# Patient Record
Sex: MALE | Race: OTHER | HISPANIC OR LATINO | URBAN - METROPOLITAN AREA
[De-identification: names, ages, dates, MRNs, and addresses within clinical notes are randomized per-mention and may not be internally consistent; named-entity substitution may affect disease eponyms.]

---

## 2019-03-06 ENCOUNTER — EMERGENCY (EMERGENCY)
Facility: HOSPITAL | Age: 34
LOS: 1 days | Discharge: HOME | End: 2019-03-06
Attending: EMERGENCY MEDICINE | Admitting: EMERGENCY MEDICINE
Payer: COMMERCIAL

## 2019-03-06 VITALS
OXYGEN SATURATION: 99 % | RESPIRATION RATE: 18 BRPM | SYSTOLIC BLOOD PRESSURE: 123 MMHG | WEIGHT: 160.06 LBS | HEART RATE: 61 BPM | TEMPERATURE: 98 F | DIASTOLIC BLOOD PRESSURE: 83 MMHG

## 2019-03-06 DIAGNOSIS — Z98.890 OTHER SPECIFIED POSTPROCEDURAL STATES: Chronic | ICD-10-CM

## 2019-03-06 LAB
ALBUMIN SERPL ELPH-MCNC: 4 G/DL — SIGNIFICANT CHANGE UP (ref 3.4–5)
ALP SERPL-CCNC: 45 U/L — SIGNIFICANT CHANGE UP (ref 40–120)
ALT FLD-CCNC: 18 U/L — SIGNIFICANT CHANGE UP (ref 12–42)
ANION GAP SERPL CALC-SCNC: 8 MMOL/L — LOW (ref 9–16)
AST SERPL-CCNC: 15 U/L — SIGNIFICANT CHANGE UP (ref 15–37)
BASOPHILS NFR BLD AUTO: 0.5 % — SIGNIFICANT CHANGE UP (ref 0–2)
BILIRUB SERPL-MCNC: 0.2 MG/DL — SIGNIFICANT CHANGE UP (ref 0.2–1.2)
BUN SERPL-MCNC: 15 MG/DL — SIGNIFICANT CHANGE UP (ref 7–23)
CALCIUM SERPL-MCNC: 9 MG/DL — SIGNIFICANT CHANGE UP (ref 8.5–10.5)
CHLORIDE SERPL-SCNC: 105 MMOL/L — SIGNIFICANT CHANGE UP (ref 96–108)
CO2 SERPL-SCNC: 26 MMOL/L — SIGNIFICANT CHANGE UP (ref 22–31)
CREAT SERPL-MCNC: 1.12 MG/DL — SIGNIFICANT CHANGE UP (ref 0.5–1.3)
EOSINOPHIL NFR BLD AUTO: 2 % — SIGNIFICANT CHANGE UP (ref 0–6)
GLUCOSE SERPL-MCNC: 94 MG/DL — SIGNIFICANT CHANGE UP (ref 70–99)
HCT VFR BLD CALC: 44.3 % — SIGNIFICANT CHANGE UP (ref 39–50)
HGB BLD-MCNC: 15.2 G/DL — SIGNIFICANT CHANGE UP (ref 13–17)
IMM GRANULOCYTES NFR BLD AUTO: 0.4 % — SIGNIFICANT CHANGE UP (ref 0–1.5)
LYMPHOCYTES # BLD AUTO: 39.4 % — SIGNIFICANT CHANGE UP (ref 13–44)
MAGNESIUM SERPL-MCNC: 2.2 MG/DL — SIGNIFICANT CHANGE UP (ref 1.6–2.6)
MCHC RBC-ENTMCNC: 30.5 PG — SIGNIFICANT CHANGE UP (ref 27–34)
MCHC RBC-ENTMCNC: 34.3 G/DL — SIGNIFICANT CHANGE UP (ref 32–36)
MCV RBC AUTO: 89 FL — SIGNIFICANT CHANGE UP (ref 80–100)
MONOCYTES NFR BLD AUTO: 11.4 % — SIGNIFICANT CHANGE UP (ref 2–14)
NEUTROPHILS NFR BLD AUTO: 46.3 % — SIGNIFICANT CHANGE UP (ref 43–77)
PLATELET # BLD AUTO: 199 K/UL — SIGNIFICANT CHANGE UP (ref 150–400)
POTASSIUM SERPL-MCNC: 3.9 MMOL/L — SIGNIFICANT CHANGE UP (ref 3.5–5.3)
POTASSIUM SERPL-SCNC: 3.9 MMOL/L — SIGNIFICANT CHANGE UP (ref 3.5–5.3)
PROT SERPL-MCNC: 7.6 G/DL — SIGNIFICANT CHANGE UP (ref 6.4–8.2)
RBC # BLD: 4.98 M/UL — SIGNIFICANT CHANGE UP (ref 4.2–5.8)
RBC # FLD: 12.1 % — SIGNIFICANT CHANGE UP (ref 10.3–14.5)
SODIUM SERPL-SCNC: 139 MMOL/L — SIGNIFICANT CHANGE UP (ref 132–145)
WBC # BLD: 5.6 K/UL — SIGNIFICANT CHANGE UP (ref 3.8–10.5)
WBC # FLD AUTO: 5.6 K/UL — SIGNIFICANT CHANGE UP (ref 3.8–10.5)

## 2019-03-06 PROCEDURE — 93010 ELECTROCARDIOGRAM REPORT: CPT

## 2019-03-06 PROCEDURE — 99284 EMERGENCY DEPT VISIT MOD MDM: CPT | Mod: 25

## 2019-03-06 RX ORDER — SODIUM CHLORIDE 9 MG/ML
1000 INJECTION INTRAMUSCULAR; INTRAVENOUS; SUBCUTANEOUS ONCE
Qty: 0 | Refills: 0 | Status: COMPLETED | OUTPATIENT
Start: 2019-03-06 | End: 2019-03-06

## 2019-03-06 RX ADMIN — SODIUM CHLORIDE 1000 MILLILITER(S): 9 INJECTION INTRAMUSCULAR; INTRAVENOUS; SUBCUTANEOUS at 19:33

## 2019-03-06 NOTE — ED PROVIDER NOTE - MUSCULOSKELETAL, MLM
Spine appears normal, range of motion of all extremities is not limited, no muscle or joint tenderness. No LE edema. Neck ROM fully intact. 5/5 strength in bilateral lower and upper extremities.

## 2019-03-06 NOTE — ED ADULT NURSE NOTE - NSIMPLEMENTINTERV_GEN_ALL_ED
Implemented All Universal Safety Interventions:  [place] to call system. Call bell, personal items and telephone within reach. Instruct patient to call for assistance. Room bathroom lighting operational. Non-slip footwear when patient is off stretcher. Physically safe environment: no spills, clutter or unnecessary equipment. Stretcher in lowest position, wheels locked, appropriate side rails in place.

## 2019-03-06 NOTE — ED PROVIDER NOTE - CLINICAL SUMMARY MEDICAL DECISION MAKING FREE TEXT BOX
34 y o male with no PMHx presents to ED for general muscular cramping, worse in left calf. No SOB or chest pain. Will give IV fluids to hydrate. Check labs including magnesium levels. EKG normal. Reassess.

## 2019-03-06 NOTE — ED PROVIDER NOTE - OBJECTIVE STATEMENT
34 y o male with no PMHx presents to ED for c/o Left calf cramping/tightness for the past x4-5 weeks. States that in the past 2 weeks he has also been having cramping sensations in his arms, shoulders, back, and neck - but not as painful as left calf cramping. Reports staying hydrated and taking potassium and magnesium supplements with no resolve. Notes some twitching in his hands and occasional general weakness in extremities. Notes occasionally eating a keto diet since January, otherwise no other extreme diet changes. Pt got blood work done on 2/21 with negative results. Denies increased physical activity or exercise. Denies double vision, eye pain, focal numbness, tingling, bladder dysfunction, chest pain, SOB, headache, N/V, bowel incontinence, or other sx. 34 y o male with no PMHx presents to ED for c/o Left calf cramping/tightness for the past x4-5 weeks. States that in the past 2 weeks he has also been having cramping sensations in his arms, shoulders, back, and neck - but not as painful as left calf cramping. Reports staying hydrated and taking potassium and magnesium supplements with no resolve. Notes some twitching in his hands and occasional general weakness in extremities. States that on some days he eats a keto diet, since January, otherwise no other extreme diet changes. Pt got blood work done on 2/21 with negative results. Denies increased physical activity or exercise. Denies double vision, eye pain, focal numbness, tingling, bladder dysfunction, chest pain, SOB, headache, N/V, bowel incontinence, or other sx. 34 y o male with no PMHx presents to ED for c/o Left calf cramping/tightness for the past x4-5 weeks. States that in the past 2 weeks he has also been having cramping sensations in his arms, shoulders, back, and neck - but not as painful as left calf cramping. Reports staying hydrated and taking potassium and magnesium supplements with no resolve. Notes some twitching in his hands and occasional general weakness in extremities. States that on some days he eats a keto diet, since January, otherwise no other extreme diet changes. Pt got blood work done on 2/21 with no abnormalities. Denies increased physical activity or exercise. Denies double vision, eye pain, focal numbness, tingling, bladder dysfunction, chest pain, SOB, headache, N/V, bowel incontinence, or other sx.  PSH - Thoracic sympathectomy 2/2 sweating - 3 years ago.

## 2019-03-06 NOTE — ED PROVIDER NOTE - OTHER RECORDS SUMMARY FREE TEXT FOR MDM OBTAINED AND REVIEWED OLD RECORDS QUESTION
Lab results from [facility] visit on 2/21/19 reviewed:   BUN and Creatine - 14 and 0.6, Sodium - 143, Potassium - 5, Calcium - 9.1  LFT's within normal limits, CBC normal   Generally all results within normal limits.

## 2019-03-06 NOTE — ED PROVIDER NOTE - CHIEF COMPLAINT
The patient is a 34y Male complaining of medical evaluation. The patient is a 34y Male complaining of muscle cramping.

## 2019-03-06 NOTE — ED PROVIDER NOTE - PROGRESS NOTE DETAILS
All labs stable.  All results discussed with pt.  Will have him f/u with a neurologist for possible neuromuscular testing.  Counselled to avoid any recreational drugs.

## 2019-03-06 NOTE — ED PROVIDER NOTE - NSFOLLOWUPINSTRUCTIONS_ED_ALL_ED_FT
-PLEASE FOLLOW-UP WITH YOUR PRIMARY CARE DOCTOR IN 1-2 DAYS.  BRING ALL PAPERWORK FROM TODAY'S VISIT TO YOUR FOLLOW-UP VISIT.  IF YOU DO NOT HAVE A PRIMARY CARE DOCTOR PLEASE REFER TO THE OFFICE/CLINIC INFORMATION GIVEN ABOVE.  YOU MAY ALSO CALL [phone] AND ASK FOR MS. SOLO WOOD.  SHE CAN HELP YOU MAKE A FOLLOW-UP APPOINTMENT.  HER HOURS ARE 11AM-7PM MONDAY - FRIDAY.    -PLEASE RETURN TO THE ER IMMEDIATELY OR CALL 911 FOR ANY HIGH FEVER, TROUBLE BREATHING, VOMITING, SEVERE PAIN, OR ANY OTHER CONCERNS.    -PLEASE FOLLOW-UP WITH A NEUROLOGIST FOR POSSIBLE NEUROMUSCULAR TESTING.

## 2019-03-06 NOTE — ED ADULT TRIAGE NOTE - CHIEF COMPLAINT QUOTE
c/o left leg calf cramping x 4-5 weeks, and now reports generalized body cramping/muscle pain x 2-3 weeks. was seen at PMD's office a week ago and had blood work done, everything came back normal (has results in phone). denies CP, SOB, HA, N/V.

## 2019-03-10 DIAGNOSIS — R25.2 CRAMP AND SPASM: ICD-10-CM

## 2019-03-10 DIAGNOSIS — M79.605 PAIN IN LEFT LEG: ICD-10-CM
